# Patient Record
Sex: FEMALE | Employment: FULL TIME | URBAN - METROPOLITAN AREA
[De-identification: names, ages, dates, MRNs, and addresses within clinical notes are randomized per-mention and may not be internally consistent; named-entity substitution may affect disease eponyms.]

---

## 2024-07-26 ENCOUNTER — OFFICE VISIT (OUTPATIENT)
Dept: INTERNAL MEDICINE CLINIC | Facility: CLINIC | Age: 24
End: 2024-07-26

## 2024-07-26 VITALS
WEIGHT: 104 LBS | SYSTOLIC BLOOD PRESSURE: 110 MMHG | HEART RATE: 98 BPM | HEIGHT: 60 IN | DIASTOLIC BLOOD PRESSURE: 66 MMHG | BODY MASS INDEX: 20.42 KG/M2 | TEMPERATURE: 97.9 F

## 2024-07-26 DIAGNOSIS — F41.9 ANXIETY: Primary | ICD-10-CM

## 2024-07-26 DIAGNOSIS — Z30.09 ENCOUNTER FOR OTHER GENERAL COUNSELING OR ADVICE ON CONTRACEPTION: ICD-10-CM

## 2024-07-26 DIAGNOSIS — N94.3 PREMENSTRUAL SYNDROME: ICD-10-CM

## 2024-07-26 PROBLEM — Z30.9 CONTRACEPTIVE MANAGEMENT: Status: ACTIVE | Noted: 2024-07-26

## 2024-07-26 RX ORDER — SERTRALINE HYDROCHLORIDE 25 MG/1
25 TABLET, FILM COATED ORAL DAILY
Qty: 30 TABLET | Refills: 3 | Status: SHIPPED | OUTPATIENT
Start: 2024-07-26 | End: 2025-01-22

## 2024-07-26 NOTE — PROGRESS NOTES
Ambulatory Visit  Name: Magali Kendall      : 2000      MRN: [id number]  Encounter Provider: Dahiana Retana MD  Encounter Date: 2024   Encounter department: [facility]    Assessment & Plan   1. Anxiety  Assessment & Plan:  Pt has had multi symptoms ( dizzy , fatigue , ear pain , palpitations )pre menses and mid cycle dating back 2 years , she had a baby 6 years ago , menarche age 11 , menses have always been fairly regular . She has seen providers in [place] and had labs done in the past all were normal . She was told sx related to anxiety and she was given lexapro 5 mg then dose increased  to 10 mg . It helped her feeling of being stressed but the physical symptoms remained unchanged She had more recent labs done 24 hgb 13.9 CMP nl , lab form states B12 , tsh , ferritin levels checked , no results seen . She will try to acquire these .  We discussed healthy diet and hydration in detail , she has no exercise at all She will start to incorporate walking into her schedule at first 3 x per week 15 minutes work up to 30 minutes and 4-5 x per week   Start zoloft 25 mg 1 po q day f/u appt with me in 4 weeks   Orders:  -     sertraline (ZOLOFT) 25 mg tablet; Take 1 tablet (25 mg total) by mouth daily  2. Premenstrual syndrome  -     sertraline (ZOLOFT) 25 mg tablet; Take 1 tablet (25 mg total) by mouth daily  3. Encounter for other general counseling or advice on contraception  Assessment & Plan:  Pt would like to discuss contraception , referral to gyn placed   Orders:  -     Ambulatory Referral to Obstetrics / Gynecology; Future         History of Present Illness     HPINew pt here to establish care , interpretor Rodriguez #  [id number] present via [employer] during office visit   [place] in [place] has long hx of dizziness , palpitations , ear pain all symptoms at time of menses had seen providers in [place] had testing in the end told she has anxiety    She began to have these issues 2 years ago , she has a Daughter born 6 years ago , she had COVID in 2021 she had no residual symptoms after that   She has been having multi symptoms pre menses , feels better during menses , then mid cycle she can have sx again ,   Pre menses feels sleepy slow weakness in legs , dizzy , this lasts ~ 1 week after menses she feels better , mid cycle she feels weak again pressure behind R ear arms feel weak , with exercise she can feel dizzy this lasts 1 week - 10 days   Menarche age 11 , menses regular light last 3 days , cycle length 22 - 28 Pt last had labs normal labs , also [place] tests checked in [place] 5/13/24 cbc nl hgb 13.9 wbc 7.4 , cmp glu 99 CO2  low , TSH , B 12 , pt has only cbc and cmp results in her phone , [facility] form states hgba1c , B12 and ferritin levels were ordered ,   Prior providers conclusion was that she has anxiety , premenstrual , she was given   She used to take medication lexapro 5 mg , increased dose to 10 mg , she last took this medication 1 year ago , she took it for ~ 4 months , she needs to take tylenol mid cycle for pain   The lexapro was helpful regarding stress level , the physical symptoms continued   Diet she eats regular meals proteins fruits and veggies , 1- 11/2 liters q day , no caffeine it makes her feel worse , - smoker ,  rare etoh   Exercise none   - fam hx of anxiety     Review of Systems   Constitutional:  Negative for chills and fever.   HENT:  Positive for ear pain. Negative for sore throat.    Eyes:  Negative for pain and visual disturbance.   Respiratory:  Negative for cough and shortness of breath.    Cardiovascular:  Negative for chest pain and palpitations.   Gastrointestinal:  Negative for abdominal pain and vomiting.   Genitourinary:  Positive for menstrual problem. Negative for dysuria, hematuria and pelvic pain.   Musculoskeletal:  Negative for arthralgias and back pain.   Skin:  Negative for color change and rash.    Neurological:  Positive for dizziness. Negative for seizures and syncope.   Psychiatric/Behavioral:  Negative for self-injury and suicidal ideas. The patient is nervous/anxious.    All other systems reviewed and are negative.    Past Medical History:   Diagnosis Date    Anxiety      Past Surgical History:   Procedure Laterality Date     SECTION      TONSILECTOMY AND ADNOIDECTOMY       Family History   Problem Relation Age of Onset    No Known Problems Mother     No Known Problems Father     No Known Problems Daughter      Social History     Tobacco Use    Smoking status: Never    Smokeless tobacco: Never   Vaping Use    Vaping status: Never Used   Substance and Sexual Activity    Alcohol use: Yes     Comment: social    Drug use: Never    Sexual activity: Not on file     No current outpatient medications on file prior to visit.     No Known Allergies    There is no immunization history on file for this patient.  Objective     /66 (BP Location: Left arm, Patient Position: Sitting, Cuff Size: Standard)   Pulse 98   Temp 97.9 °F (36.6 °C) (Temporal)   Ht 5' (1.524 m)   Wt 47.2 kg (104 lb)   BMI 20.31 kg/m²     Physical Exam  Vitals and nursing note reviewed.   Constitutional:       General: She is not in acute distress.     Appearance: She is well-developed.      Comments: Skin with good color turgor , well hydrated ,no distress noted     HENT:      Head: Normocephalic and atraumatic.      Right Ear: No decreased hearing noted. No middle ear effusion. There is no impacted cerumen.      Left Ear: No decreased hearing noted.  No middle ear effusion. There is no impacted cerumen.      Mouth/Throat:      Pharynx: Oropharynx is clear.   Eyes:      Conjunctiva/sclera: Conjunctivae normal.   Neck:      Thyroid: No thyromegaly.   Cardiovascular:      Rate and Rhythm: Normal rate and regular rhythm.      Heart sounds: Normal heart sounds. No murmur heard.  Pulmonary:      Effort: Pulmonary effort is normal.  No respiratory distress.      Breath sounds: Normal breath sounds.   Abdominal:      Palpations: Abdomen is soft.      Tenderness: There is no abdominal tenderness.   Musculoskeletal:         General: No swelling.      Cervical back: Neck supple.   Lymphadenopathy:      Cervical:      Right cervical: No superficial or posterior cervical adenopathy.     Left cervical: No superficial or posterior cervical adenopathy.   Skin:     General: Skin is warm and dry.      Capillary Refill: Capillary refill takes less than 2 seconds.   Neurological:      Mental Status: She is alert.      Comments: Non focal exam    Psychiatric:         Attention and Perception: Attention normal.         Mood and Affect: Mood normal.         Speech: Speech normal.         Behavior: Behavior normal.         Thought Content: Thought content normal.

## 2024-07-26 NOTE — ASSESSMENT & PLAN NOTE
Pt has had multi symptoms ( dizzy , fatigue , ear pain , palpitations )pre menses and mid cycle dating back 2 years , she had a baby 6 years ago , menarche age 11 , menses have always been fairly regular . She has seen providers in [place] and had labs done in the past all were normal . She was told sx related to anxiety and she was given lexapro 5 mg then dose increased  to 10 mg . It helped her feeling of being stressed but the physical symptoms remained unchanged She had more recent labs done 5/13/24 hgb 13.9 CMP nl , lab form states B12 , tsh , ferritin levels checked , no results seen . She will try to acquire these .  We discussed healthy diet and hydration in detail , she has no exercise at all She will start to incorporate walking into her schedule at first 3 x per week 15 minutes work up to 30 minutes and 4-5 x per week   Start zoloft 25 mg 1 po q day f/u appt with me in 4 weeks

## 2024-08-23 ENCOUNTER — OFFICE VISIT (OUTPATIENT)
Dept: INTERNAL MEDICINE CLINIC | Facility: CLINIC | Age: 24
End: 2024-08-23

## 2024-08-23 VITALS
WEIGHT: 104 LBS | TEMPERATURE: 97.9 F | HEART RATE: 76 BPM | BODY MASS INDEX: 20.31 KG/M2 | DIASTOLIC BLOOD PRESSURE: 62 MMHG | SYSTOLIC BLOOD PRESSURE: 92 MMHG

## 2024-08-23 DIAGNOSIS — F41.9 ANXIETY: Primary | ICD-10-CM

## 2024-08-23 DIAGNOSIS — N94.3 PREMENSTRUAL SYNDROME: ICD-10-CM

## 2024-08-23 PROCEDURE — 99213 OFFICE O/P EST LOW 20 MIN: CPT | Performed by: FAMILY MEDICINE

## 2024-08-23 RX ORDER — SERTRALINE HYDROCHLORIDE 25 MG/1
TABLET, FILM COATED ORAL
Qty: 105 TABLET | Refills: 3 | Status: SHIPPED | OUTPATIENT
Start: 2024-08-23

## 2024-08-23 NOTE — PROGRESS NOTES
Ambulatory Visit  Name: Magali Kendall      : 2000      MRN: [id number]  Encounter Provider: Dahiana Retana MD  Encounter Date: 2024   Encounter department: [facility]    Assessment & Plan   1. Anxiety  Assessment & Plan:  Pt feeling much better on zoloft 25 mg , both anxiety and physical symptoms are so much improved , she does however still feel tired achy weak on her menses   We discussed healthy diet good hydration , continue zoloft 25 mg daily and during menses increase to 50 mg po q day , she is agreeable to this plan , f/u here 3 months sooner if needed   Orders:  -     sertraline (ZOLOFT) 25 mg tablet; 1 po daily hs , during menses take 2 po hs ( 5 days )  2. Premenstrual syndrome         History of Present Illness     HPI Pt here follow up anxiety , new medication zoloft 25 mg Diana interpretor #  [id number] present during office visit   Pt has first day felt very shaky , since then she changed time of taking med to hs and this is working well , anxiety controlled not having physical sx When she is on her menses she still has symptoms weakness leg pain  fatigue 3-5 days   Review of Systems   Constitutional:  Negative for chills and fever.   HENT:  Negative for ear pain and sore throat.    Eyes:  Negative for pain and visual disturbance.   Respiratory:  Negative for cough and shortness of breath.    Cardiovascular:  Negative for chest pain and palpitations.   Gastrointestinal:  Negative for abdominal pain and vomiting.   Genitourinary:  Positive for menstrual problem. Negative for dysuria and hematuria.        Pre menstrual syndrome    Musculoskeletal:  Negative for arthralgias and back pain.   Skin:  Negative for color change and rash.   Neurological:  Negative for seizures and syncope.   Psychiatric/Behavioral:  Negative for sleep disturbance. The patient is not nervous/anxious.    All other systems reviewed and are negative.    Past Medical  History:   Diagnosis Date    Anxiety      Past Surgical History:   Procedure Laterality Date     SECTION      TONSILECTOMY AND ADNOIDECTOMY       Family History   Problem Relation Age of Onset    No Known Problems Mother     No Known Problems Father     No Known Problems Daughter      Social History     Tobacco Use    Smoking status: Never    Smokeless tobacco: Never   Vaping Use    Vaping status: Never Used   Substance and Sexual Activity    Alcohol use: Yes     Comment: social    Drug use: Never    Sexual activity: Not on file     Current Outpatient Medications on File Prior to Visit   Medication Sig    [DISCONTINUED] sertraline (ZOLOFT) 25 mg tablet Take 1 tablet (25 mg total) by mouth daily     No Known Allergies    There is no immunization history on file for this patient.  Objective     BP 92/62 (BP Location: Left arm, Patient Position: Sitting, Cuff Size: Standard)   Pulse 76   Temp 97.9 °F (36.6 °C) (Temporal)   Wt 47.2 kg (104 lb)   BMI 20.31 kg/m²     Physical Exam  Vitals and nursing note reviewed.   Constitutional:       General: She is not in acute distress.     Appearance: She is well-developed.      Comments: Skin with good color turgor , well hydrated ,no distress noted     HENT:      Head: Normocephalic and atraumatic.      Right Ear: No decreased hearing noted.      Left Ear: No decreased hearing noted.   Eyes:      Conjunctiva/sclera: Conjunctivae normal.   Neck:      Thyroid: No thyromegaly.   Cardiovascular:      Rate and Rhythm: Normal rate and regular rhythm.      Heart sounds: Normal heart sounds. No murmur heard.  Pulmonary:      Effort: Pulmonary effort is normal. No respiratory distress.      Breath sounds: Normal breath sounds.   Abdominal:      Palpations: Abdomen is soft.      Tenderness: There is no abdominal tenderness.   Musculoskeletal:         General: No swelling.      Cervical back: Neck supple.   Lymphadenopathy:      Cervical:      Right cervical: No superficial  cervical adenopathy.     Left cervical: No superficial cervical adenopathy.   Skin:     General: Skin is warm and dry.      Capillary Refill: Capillary refill takes less than 2 seconds.   Neurological:      Mental Status: She is alert.   Psychiatric:         Mood and Affect: Mood normal.

## 2024-08-23 NOTE — ASSESSMENT & PLAN NOTE
Pt feeling much better on zoloft 25 mg , both anxiety and physical symptoms are so much improved , she does however still feel tired achy weak on her menses   We discussed healthy diet good hydration , continue zoloft 25 mg daily and during menses increase to 50 mg po q day , she is agreeable to this plan , f/u here 3 months sooner if needed

## 2024-09-05 ENCOUNTER — OFFICE VISIT (OUTPATIENT)
Dept: OBGYN CLINIC | Facility: CLINIC | Age: 24
End: 2024-09-05

## 2024-09-05 VITALS
SYSTOLIC BLOOD PRESSURE: 99 MMHG | BODY MASS INDEX: 20.22 KG/M2 | HEIGHT: 60 IN | DIASTOLIC BLOOD PRESSURE: 60 MMHG | HEART RATE: 73 BPM | WEIGHT: 103 LBS

## 2024-09-05 DIAGNOSIS — Z11.3 SCREEN FOR STD (SEXUALLY TRANSMITTED DISEASE): ICD-10-CM

## 2024-09-05 DIAGNOSIS — Z12.39 ENCOUNTER FOR BREAST CANCER SCREENING USING NON-MAMMOGRAM MODALITY: ICD-10-CM

## 2024-09-05 DIAGNOSIS — Z01.419 WOMEN'S ANNUAL ROUTINE GYNECOLOGICAL EXAMINATION: Primary | ICD-10-CM

## 2024-09-05 DIAGNOSIS — Z23 NEED FOR HPV VACCINATION: ICD-10-CM

## 2024-09-05 DIAGNOSIS — Z30.09 BIRTH CONTROL COUNSELING: ICD-10-CM

## 2024-09-05 DIAGNOSIS — Z12.4 CERVICAL CANCER SCREENING: ICD-10-CM

## 2024-09-05 DIAGNOSIS — N93.9 ABNORMAL UTERINE BLEEDING (AUB): ICD-10-CM

## 2024-09-05 PROCEDURE — G0145 SCR C/V CYTO,THINLAYER,RESCR: HCPCS | Performed by: NURSE PRACTITIONER

## 2024-09-05 PROCEDURE — 90471 IMMUNIZATION ADMIN: CPT | Performed by: NURSE PRACTITIONER

## 2024-09-05 PROCEDURE — 87491 CHLMYD TRACH DNA AMP PROBE: CPT | Performed by: NURSE PRACTITIONER

## 2024-09-05 PROCEDURE — 87591 N.GONORRHOEAE DNA AMP PROB: CPT | Performed by: NURSE PRACTITIONER

## 2024-09-05 PROCEDURE — 99385 PREV VISIT NEW AGE 18-39: CPT | Performed by: NURSE PRACTITIONER

## 2024-09-05 PROCEDURE — 90651 9VHPV VACCINE 2/3 DOSE IM: CPT | Performed by: NURSE PRACTITIONER

## 2024-09-05 RX ORDER — NORGESTIMATE AND ETHINYL ESTRADIOL 0.25-0.035
1 KIT ORAL DAILY
Qty: 28 TABLET | Refills: 6 | Status: SHIPPED | OUTPATIENT
Start: 2024-09-05

## 2024-09-05 NOTE — PROGRESS NOTES
ANNUAL GYNECOLOGICAL EXAMINATION    Magali Kendall is a 23 y.o. female who presents today for annual GYN exam.  Her last pap smear was performed about 2 years ago and result was normal per patient.  She reports no history of abnormal pap smears in her past. She has not had the HPV vaccine in the past. She has not had a recent HIV screening performed.  She reports menses as regular/monthly, though she reports since the birth of her daughter six years ago her menses have significantly changed. She reports that for the first two days of menses she has very heavy bleeding, followed by around 3 days of occasional spotting only. Menses in the past had been a small-moderate amount for 4-5 days.  Patient's last menstrual period was 08/10/2024 (exact date).  Her general medical history has been reviewed and she reports it as follows:    Past Medical History:   Diagnosis Date    Anxiety      Past Surgical History:   Procedure Laterality Date     SECTION      TONSILECTOMY AND ADNOIDECTOMY       OB History          1    Para   1    Term   1            AB        Living   1         SAB        IAB        Ectopic        Multiple        Live Births   1               Social History     Tobacco Use    Smoking status: Never    Smokeless tobacco: Never   Vaping Use    Vaping status: Never Used   Substance Use Topics    Alcohol use: Yes     Comment: social    Drug use: Never     Social History     Substance and Sexual Activity   Sexual Activity Yes    Partners: Male    Birth control/protection: None     Cancer-related family history includes Colon cancer in her paternal grandfather. There is no history of Breast cancer or Ovarian cancer.    Current Outpatient Medications   Medication Instructions    sertraline (ZOLOFT) 25 mg tablet 1 po daily hs , during menses take 2 po hs ( 5 days )       Review of Systems:  Review of Systems   Constitutional: Negative.    Gastrointestinal: Negative.    Genitourinary:  Negative.    Skin: Negative.        Physical Exam:  BP 99/60 (BP Location: Right arm, Patient Position: Sitting)   Pulse 73   Ht 5' (1.524 m)   Wt 46.7 kg (103 lb)   LMP 08/10/2024 (Exact Date)   BMI 20.12 kg/m²   Physical Exam  Constitutional:       General: She is not in acute distress.     Appearance: Normal appearance.   Genitourinary:      Vulva and bladder normal.      No lesions in the vagina.      No vaginal erythema or ulceration.        Right Adnexa: not tender and no mass present.     Left Adnexa: not tender and no mass present.     No cervical motion tenderness or lesion.      Uterus is not enlarged or tender.      No uterine mass detected.  Breasts:     Right: No mass, nipple discharge or skin change.      Left: No mass, nipple discharge or skin change.   Cardiovascular:      Rate and Rhythm: Normal rate and regular rhythm.   Pulmonary:      Effort: Pulmonary effort is normal.      Breath sounds: Normal breath sounds.   Abdominal:      General: Abdomen is flat.      Palpations: Abdomen is soft.   Musculoskeletal:      Cervical back: Neck supple.   Neurological:      Mental Status: She is alert.   Skin:     General: Skin is warm and dry.   Psychiatric:         Mood and Affect: Mood normal.         Behavior: Behavior normal.   Vitals reviewed.       Assessment/Plan:   1. Normal well-woman GYN exam.  2. Cervical cancer screening:  Normal cervical exam.  Pap smear done.  Has not received HPV vaccine in the past, but she desires to initiate vaccine series now.  Given HPV vaccine today and she will return in 2 and 6 months for subsequent vaccinations.   3. STD screening: Orders placed for vaginal GC/CT cultures.  Orders placed for serum anti-HIV, anti-HCV, HbsAg, syphilis panel.   4. Breast cancer screening:  Normal breast exam. Reviewed breast self-awareness.   5. Depression Screening: Patient's depression screening was assessed with a PHQ-2 score of 1. Their PHQ-9 score was 3. Clinically patient does  not have depression. No treatment is required. Continue regular follow-up with their psychologist/therapist/psychiatrist who is managing their mental health condition(s).     6. BMI Counseling: Body mass index is 20.12 kg/m². Discussed the patient's BMI with her. The BMI is normal.    7. Contraception:  Currently using cycle tracking but desires a more reliable method. Education given regarding options for contraception, including barrier methods, injectable contraception, IUD placement, oral contraceptives, nuvaring, nexplanon and contraceptive patch . She is currently uninsured and desires to begin OCPs due to cost effectiveness, may be interested in an IUD at a later time. Rx for Sprintec sent to [facility], this is part of their $9 drug plan. Reviewed instructions for use, expected bleeding pattern, risk/benefit.    8. Orders placed for pelvic US.    9. Return to office in 3 months for OCP follow up and next HPV vaccine dose.    Reviewed with patient that test results are available in [facility] immediately, but that they will not necessarily be reviewed by me immediately.  Explained that I will review results at my earliest opportunity and contact patient appropriately.

## 2024-09-10 LAB
LAB AP GYN PRIMARY INTERPRETATION: NORMAL
Lab: NORMAL

## 2024-09-11 ENCOUNTER — TELEPHONE (OUTPATIENT)
Dept: OBGYN CLINIC | Facility: CLINIC | Age: 24
End: 2024-09-11

## 2024-09-11 LAB
C TRACH DNA SPEC QL NAA+PROBE: NEGATIVE
N GONORRHOEA DNA SPEC QL NAA+PROBE: NEGATIVE

## 2024-09-11 NOTE — TELEPHONE ENCOUNTER
Called pt and informed of results pt verbalized understanding.      ----- Message from DEBORAH Crowell sent at 9/11/2024  9:43 AM EDT -----  Please let her know the pap and sti testing is negative. Thank you!

## 2024-09-30 ENCOUNTER — HOSPITAL ENCOUNTER (OUTPATIENT)
Dept: RADIOLOGY | Facility: HOSPITAL | Age: 24
Discharge: HOME | End: 2024-09-30

## 2024-09-30 DIAGNOSIS — N93.9 ABNORMAL UTERINE BLEEDING (AUB): ICD-10-CM

## 2024-09-30 PROCEDURE — 76830 TRANSVAGINAL US NON-OB: CPT

## 2024-09-30 PROCEDURE — 76856 US EXAM PELVIC COMPLETE: CPT

## 2024-10-03 ENCOUNTER — TELEPHONE (OUTPATIENT)
Dept: OBGYN CLINIC | Facility: CLINIC | Age: 24
End: 2024-10-03

## 2024-10-03 NOTE — TELEPHONE ENCOUNTER
Called pt and informed of results pt verbalized understanding.    ----- Message from Rome AVILA sent at 10/2/2024  2:41 PM EDT -----  Phone call  attempted 2x's with  [phone] assistance and was unable to leave a vm with results.  ----- Message -----  From: DEBORAH Crowell  Sent: 10/2/2024   7:51 AM EDT  To: [facility]    Please let her know the pelvic US is negative. Thank you!

## 2024-11-25 ENCOUNTER — OFFICE VISIT (OUTPATIENT)
Dept: OBGYN CLINIC | Facility: CLINIC | Age: 24
End: 2024-11-25

## 2024-11-25 ENCOUNTER — OFFICE VISIT (OUTPATIENT)
Dept: INTERNAL MEDICINE CLINIC | Facility: CLINIC | Age: 24
End: 2024-11-25

## 2024-11-25 VITALS
SYSTOLIC BLOOD PRESSURE: 104 MMHG | DIASTOLIC BLOOD PRESSURE: 62 MMHG | BODY MASS INDEX: 22.42 KG/M2 | HEIGHT: 58 IN | HEART RATE: 96 BPM | WEIGHT: 106.8 LBS

## 2024-11-25 VITALS
SYSTOLIC BLOOD PRESSURE: 96 MMHG | OXYGEN SATURATION: 98 % | TEMPERATURE: 97.8 F | BODY MASS INDEX: 20.9 KG/M2 | DIASTOLIC BLOOD PRESSURE: 60 MMHG | HEART RATE: 65 BPM | WEIGHT: 107 LBS

## 2024-11-25 DIAGNOSIS — Z30.09 EMERGENCY CONTRACEPTIVE COUNSELING: ICD-10-CM

## 2024-11-25 DIAGNOSIS — Z11.4 SCREENING FOR HIV (HUMAN IMMUNODEFICIENCY VIRUS): ICD-10-CM

## 2024-11-25 DIAGNOSIS — F41.9 ANXIETY: Primary | ICD-10-CM

## 2024-11-25 DIAGNOSIS — Z23 NEED FOR HPV VACCINE: Primary | ICD-10-CM

## 2024-11-25 PROCEDURE — 90471 IMMUNIZATION ADMIN: CPT | Performed by: OBSTETRICS & GYNECOLOGY

## 2024-11-25 PROCEDURE — 99212 OFFICE O/P EST SF 10 MIN: CPT | Performed by: OBSTETRICS & GYNECOLOGY

## 2024-11-25 PROCEDURE — 90651 9VHPV VACCINE 2/3 DOSE IM: CPT | Performed by: OBSTETRICS & GYNECOLOGY

## 2024-11-25 RX ORDER — METHOCARBAMOL 500 MG/1
500 TABLET, FILM COATED ORAL 4 TIMES DAILY
Qty: 56 TABLET | Refills: 0 | Status: SHIPPED | OUTPATIENT
Start: 2024-11-25 | End: 2024-11-25 | Stop reason: CLARIF

## 2024-11-25 RX ORDER — SERTRALINE HYDROCHLORIDE 25 MG/1
TABLET, FILM COATED ORAL
Qty: 105 TABLET | Refills: 3 | Status: SHIPPED | OUTPATIENT
Start: 2024-11-25

## 2024-11-25 RX ORDER — LEVONORGESTREL 1.5 MG/1
1.5 TABLET ORAL ONCE
Qty: 1 TABLET | Refills: 0 | Status: SHIPPED | OUTPATIENT
Start: 2024-11-25 | End: 2024-11-25

## 2024-11-25 NOTE — PROGRESS NOTES
"OB/GYN VISIT  Magali Kendall  2024  10:13 AM    ASSESSMENT / PLAN:    Magali Kendall is a 24 y.o.  female presenting for birth control discussion. Patient was prescribed Sprintec at her previous visit on . She has stopped taking it due to decreased libido. Patient is on Zoloft for her anxiety and reports that the combination of OCPs + Zoloft made her libido even worse. She does not desire any contraception at this time. Patient informed on her options. She will be using condoms. Plan B sent to her pharmacy for emergencies.     Patient also to received 2nd dose of Gardasil today.    SUBJECTIVE:    Magali Kendall is a 24 y.o.  female with pmhx of anxiety on Zoloft presenting for discussion about birth control. She was recently on OCPs but feels that her libido has decreased. Patient report the Zoloft was already affecting her libido and now with the OCPs she has no sexual desire. She has tried many anti depressants and she is happy with the Zoloft. She does not wish to change her anti depressant at this time. She is using condoms and she is using them correctly. She does not desire a pregnancy.     Past Medical History:   Diagnosis Date    Anxiety        Past Surgical History:   Procedure Laterality Date     SECTION      TONSILECTOMY AND ADNOIDECTOMY         OBJECTIVE:    Vitals:  Blood pressure 104/62, pulse 96, height 4' 10\" (1.473 m), weight 48.4 kg (106 lb 12.8 oz), last menstrual period 2024.Body mass index is 22.32 kg/m².    Physical Exam:    Physical Exam  Constitutional:       Appearance: Normal appearance.   HENT:      Head: Atraumatic.   Eyes:      Extraocular Movements: Extraocular movements intact.      Conjunctiva/sclera: Conjunctivae normal.   Cardiovascular:      Rate and Rhythm: Normal rate and regular rhythm.      Pulses: Normal pulses.   Pulmonary:      Effort: Pulmonary effort is normal. No respiratory distress.      Breath " sounds: Normal breath sounds.   Abdominal:      Palpations: Abdomen is soft.      Tenderness: There is no abdominal tenderness.   Neurological:      General: No focal deficit present.      Mental Status: She is alert and oriented to person, place, and time.   Skin:     General: Skin is warm and dry.   Psychiatric:         Mood and Affect: Mood normal.         Behavior: Behavior normal.   Vitals reviewed. Exam conducted with a chaperone present.       Susy Flores MD  11/25/2024  10:13 AM

## 2024-11-25 NOTE — PROGRESS NOTES
INTERNAL MEDICINE FOLLOW-UP OFFICE VISIT  [facility]  [address]  [place] [address]    NAME: Magali Kendall  AGE: 24 y.o. SEX: female    DATE OF ENCOUNTER: 11/25/2024    Assessment and Plan     1. Anxiety (Primary)  Patient here for follow up of anxiety. Was diagnosed with this in [place] and was initially on Lexapro 5mg qd prior to US arrival about 1 year ago. Patient was seen by PCP in 7/2024 and was switched to Zoloft 25mg qd for improved symptomatic control. Reported palpitations in past. Now resolved. No depressed mood. Controlled on Zoloft 25mg. Tolerating medication well without reported adverse effects. No SI/HI. No active stressors. No cardiopulmonary symptoms. No neurological sxs. No recent labs on file for review. HDS , exam benign.  Plan: Continue present management. Will obtain additional labs including CBC and TSH to assess alternative etiologies.. Return precautions provided. Discussed CBT, patient state she has to leave to attend to family matter, can be readressed at follow up visit.      No orders of the defined types were placed in this encounter.      - Counseling Documentation: patient was counseled regarding: diagnostic results, instructions for management, risk factor reductions, prognosis, patient and family education, impressions, risks and benefits of treatment options, and importance of compliance with treatment      Chief Complaint     Chief Complaint   Patient presents with   • Follow-up       History of Present Illness     Interpretor #[id number]     25yo F with hx of recently diagnosed anxiety  in [place] and was initially on Lexapro 5mg qd prior to US arrival about 1 year ago who presents for follow up. Patient was seen by PCP in 7/2024 and was switched to Zoloft 25mg qd for improved symptomatic control. Reported palpitations in past. Now resolved. No depressed mood. Controlled on Zoloft 25mg. Tolerating medication well  "without reported adverse effects. No SI/HI. No active stressors. No n.v.d. No cardiopulmonary symptoms. No neurological sxs.       The following portions of the patient's history were reviewed and updated as appropriate: allergies, current medications, past family history, past medical history, past social history, past surgical history and problem list.    Review of Systems     Review of Systems  All ROS negative unless otherwise stated in the HPI    Active Problem List     Patient Active Problem List   Diagnosis   • Anxiety   • Premenstrual syndrome   • Contraceptive management       Objective     BP 96/60 (BP Location: Left arm, Patient Position: Sitting, Cuff Size: Standard)   Pulse 65   Temp 97.8 °F (36.6 °C) (Temporal)   Wt 48.5 kg (107 lb)   SpO2 98%   BMI 20.90 kg/m²     Physical Exam  Constitutional:       General: She is not in acute distress.     Appearance: Normal appearance. She is not ill-appearing or toxic-appearing.   HENT:      Nose: Nose normal.      Mouth/Throat:      Mouth: Mucous membranes are moist.   Eyes:      Conjunctiva/sclera: Conjunctivae normal.   Cardiovascular:      Rate and Rhythm: Normal rate and regular rhythm.      Pulses: Normal pulses.      Heart sounds: Normal heart sounds. No murmur heard.     No friction rub. No gallop.   Pulmonary:      Effort: Pulmonary effort is normal.      Breath sounds: Normal breath sounds. No wheezing or rhonchi.   Abdominal:      General: There is no distension.      Palpations: Abdomen is soft.      Tenderness: There is no abdominal tenderness.   Musculoskeletal:      Right lower leg: No edema.      Left lower leg: No edema.   Skin:     Capillary Refill: Capillary refill takes less than 2 seconds.      Findings: No rash.   Neurological:      Mental Status: She is alert and oriented to person, place, and time. Mental status is at baseline.     Pertinent Laboratory/Diagnostic Studies:  CBC: No results found for: \"WBC\", \"RBC\", \"HGB\", \"HCT\", \"MCV\", " "\"MCH\", \"MCHC\", \"RDW\", \"MPV\", \"PLT\", \"NRBC\", \"NEUTOPHILPCT\", \"LYMPHOPCT\", \"MONOPCT\", \"EOSPCT\", \"BASOPCT\", \"NEUTROABS\", \"LYMPHSABS\", \"MONOSABS\", \"EOSABS\"  Chemistry Profile: No results found for: \"NA\", \"K\", \"CL\", \"CO2\", \"ANIONGAP\", \"BUN\", \"CREATININE\", \"GLUC\", \"GLUF\", \"GLUCOSE\", \"CALCIUM\", \"CORRECTEDCA\", \"MG\", \"PHOS\", \"AST\", \"ALT\", \"ALKPHOS\", \"PROT\", \"BILITOT\", \"EGFR\"  Endocrine Studies: No results for input(s): \"HGBA1C\", \"ENR6ADBAHKTG\", \"T3FREE\", \"T9AFXOD\", \"FREET4\", \"THYMICROANTI\", \"THGAB\", \"TRIG\", \"CHOL\", \"CHOLESTEROL\", \"HDL\", \"LDLC\", \"LDLCALC\", \"LDLDIRECT\", \"LDL\", \"IUGE46CFHEUA\", \"PTH\", \"HHXM238OPMP\" in the last 8784 hours.    Invalid input(s): \"MGDJVCJOZ1S\", \"OTRFJLH4B\", \"LDLD\", \"EXTLDL\"  Health Maintenance: No results for input(s): \"PSA\", \"HEPCAB\" in the last 8784 hours.    Current Medications     Current Outpatient Medications:   •  norgestimate-ethinyl estradiol (Sprintec 28) 0.25-35 MG-MCG per tablet, Take 1 tablet by mouth daily, Disp: 28 tablet, Rfl: 6  •  sertraline (ZOLOFT) 25 mg tablet, 1 po daily hs , during menses take 2 po hs ( 5 days ), Disp: 105 tablet, Rfl: 3    Health Maintenance     Health Maintenance   Topic Date Due   • Hepatitis C Screening  Never done   • HIV Screening  Never done   • Annual Physical  Never done   • HPV Vaccine (2 - 3-dose series) 10/03/2024   • COVID-19 Vaccine (1 - 2024-25 season) 02/25/2025 (Originally 9/1/2024)   • Influenza Vaccine (1) 06/30/2025 (Originally 9/1/2024)   • DTaP,Tdap,and Td Vaccines (1 - Tdap) 11/25/2025 (Originally 9/22/2019)   • Depression Screening  09/05/2025   • BMI: Adult  11/25/2025   • Cervical Cancer Screening  09/05/2027   • Zoster Vaccine (1 of 2) 09/22/2048   • RSV Vaccine Age 60+ Years (1 - 1-dose 75+ series) 09/22/2073   • RSV Vaccine age 0-20 Months  Aged Out   • Pneumococcal Vaccine: Pediatrics (0 to 5 Years) and At-Risk Patients (6 to 64 Years)  Aged Out   • HIB Vaccine  Aged Out   • IPV Vaccine  Aged Out   • Hepatitis A Vaccine  Aged Out "   • Meningococcal ACWY Vaccine  Aged Out     Immunization History   Administered Date(s) Administered   • HPV9 09/05/2024         -----------------------------------------1-----------  Santos Lew DO, PGY-3  Internal Medicine Residency   [facility] [place], [place]